# Patient Record
Sex: MALE | Race: WHITE | NOT HISPANIC OR LATINO | Employment: UNEMPLOYED | URBAN - METROPOLITAN AREA
[De-identification: names, ages, dates, MRNs, and addresses within clinical notes are randomized per-mention and may not be internally consistent; named-entity substitution may affect disease eponyms.]

---

## 2024-02-03 ENCOUNTER — OFFICE VISIT (OUTPATIENT)
Dept: URGENT CARE | Facility: CLINIC | Age: 34
End: 2024-02-03
Payer: COMMERCIAL

## 2024-02-03 VITALS
OXYGEN SATURATION: 96 % | TEMPERATURE: 97 F | DIASTOLIC BLOOD PRESSURE: 76 MMHG | HEART RATE: 84 BPM | HEIGHT: 68 IN | RESPIRATION RATE: 18 BRPM | WEIGHT: 270 LBS | BODY MASS INDEX: 40.92 KG/M2 | SYSTOLIC BLOOD PRESSURE: 144 MMHG

## 2024-02-03 DIAGNOSIS — K11.20 SIALADENITIS: Primary | ICD-10-CM

## 2024-02-03 PROCEDURE — G0382 LEV 3 HOSP TYPE B ED VISIT: HCPCS | Performed by: PHYSICIAN ASSISTANT

## 2024-02-03 RX ORDER — PREDNISONE 20 MG/1
40 TABLET ORAL DAILY
Qty: 10 TABLET | Refills: 0 | Status: SHIPPED | OUTPATIENT
Start: 2024-02-03 | End: 2024-02-08

## 2024-02-03 NOTE — PROGRESS NOTES
[facility]        NAME: Justo Colin is a 33 y.o. male  : 1990    MRN: [id number]  DATE: February 3, 2024  TIME: 3:01 PM    Assessment and Plan   Sialadenitis [K11.20]  1. Sialadenitis  predniSONE 20 mg tablet    Ambulatory referral to Family Practice            Patient Instructions   Prednisone.  Fluids.  Lollipops. Chew gum.    Follow up with PCP in 3-5 days.  Proceed to  ER if symptoms worsen.    Chief Complaint     Chief Complaint   Patient presents with    Neck Pain     Jaw pain into right side of neck that worsened since yesterday          History of Present Illness       Patient a 33-year-old male with no significant past medical history presents the office complaining of right-sided neck pain.  Pain is rated 6 out of 10 which is worse with swallowing, cough, sneeze, and neck movements.  States he has 2 broken teeth in the same area so he thought that is what was causing his pain but denies dental pain or pain with chewing.  Orts he also is congestion which is chronic in nature.        Review of Systems   Review of Systems   Constitutional:  Negative for chills and fever.   HENT:  Positive for congestion and sore throat. Negative for dental problem, ear discharge, ear pain and facial swelling.    Respiratory:  Negative for cough.    Cardiovascular:  Negative for chest pain.   Gastrointestinal:  Negative for abdominal pain, diarrhea, nausea and vomiting.   Skin:  Negative for rash.   Neurological:  Negative for dizziness, light-headedness and headaches.         Current Medications       Current Outpatient Medications:     predniSONE 20 mg tablet, Take 2 tablets (40 mg total) by mouth daily for 5 days, Disp: 10 tablet, Rfl: 0    Current Allergies     Allergies as of 2024 - Reviewed 2024   Allergen Reaction Noted    Penicillins Diarrhea and Rash 2006            The following portions of the patient's history were reviewed and updated as appropriate: allergies, current  "medications, past family history, past medical history, past social history, past surgical history and problem list.     History reviewed. No pertinent past medical history.    Past Surgical History:   Procedure Laterality Date    TOE SURGERY         History reviewed. No pertinent family history.      Medications have been verified.        Objective   /76   Pulse 84   Temp (!) 97 °F (36.1 °C) (Tympanic)   Resp 18   Ht 5' 8\" (1.727 m)   Wt 122 kg (270 lb)   SpO2 96%   BMI 41.05 kg/m²   No LMP for male patient.       Physical Exam     Physical Exam  Vitals and nursing note reviewed.   Constitutional:       Appearance: Normal appearance. He is well-developed.   HENT:      Head: Normocephalic and atraumatic.      Right Ear: Tympanic membrane, ear canal and external ear normal.      Left Ear: Tympanic membrane, ear canal and external ear normal.      Nose: Nose normal.      Mouth/Throat:      Dentition: Abnormal dentition. Dental caries present. No dental tenderness, gingival swelling or dental abscesses.      Pharynx: Uvula midline.   Eyes:      General: Lids are normal.      Conjunctiva/sclera: Conjunctivae normal.      Pupils: Pupils are equal, round, and reactive to light.   Neck:     Cardiovascular:      Rate and Rhythm: Normal rate and regular rhythm.      Heart sounds: Normal heart sounds. No murmur heard.     No friction rub. No gallop.   Pulmonary:      Effort: Pulmonary effort is normal.      Breath sounds: Normal breath sounds. No stridor. No wheezing or rales.   Musculoskeletal:         General: Normal range of motion.      Cervical back: Neck supple.   Skin:     General: Skin is warm and dry.      Capillary Refill: Capillary refill takes less than 2 seconds.   Neurological:      Mental Status: He is alert.                   "

## 2024-04-29 ENCOUNTER — OFFICE VISIT (OUTPATIENT)
Dept: URGENT CARE | Facility: CLINIC | Age: 34
End: 2024-04-29
Payer: COMMERCIAL

## 2024-04-29 DIAGNOSIS — K08.89 PAIN, DENTAL: Primary | ICD-10-CM

## 2024-04-29 PROCEDURE — 99213 OFFICE O/P EST LOW 20 MIN: CPT | Performed by: PHYSICIAN ASSISTANT

## 2024-04-30 VITALS
WEIGHT: 274 LBS | SYSTOLIC BLOOD PRESSURE: 150 MMHG | OXYGEN SATURATION: 96 % | BODY MASS INDEX: 41.52 KG/M2 | TEMPERATURE: 97.4 F | DIASTOLIC BLOOD PRESSURE: 86 MMHG | RESPIRATION RATE: 16 BRPM | HEART RATE: 94 BPM | HEIGHT: 68 IN

## 2024-04-30 RX ORDER — IBUPROFEN 800 MG/1
TABLET ORAL EVERY 6 HOURS PRN
COMMUNITY

## 2024-04-30 NOTE — PROGRESS NOTES
[facility]        NAME: Justo Colin is a 33 y.o. male  : 1990    MRN: [id number]  DATE: 2024  TIME: 3:47 PM    Assessment and Plan   Pain, dental [K08.89]  1. Pain, dental          This encounter was completed during downtime. Notes were transcribed.    Clindamycin 300mg prescribed. Take 1 pill TID x 7d. Take 2 pills for loading dosage.(paper script)    Patient Instructions     Medication as prescribed  Follow up with dentist  Salt water gargles  Ice over area  Ibuprofen 600-800mg + Tylenol 1000mg every 6 hours. Do not exceed this amount.  Follow up with PCP in 3-5 days.  Proceed to  ER if symptoms worsen.    If tests have been performed at [facility], our office will contact you with results if changes need to be made to the care plan discussed with you at the visit.  You can review your full results on [facility] MyChart.    Eat yogurt with live and active cultures and/or take a probiotic at least 3 hours before or after antibiotic dose. Monitor stool for diarrhea and/or blood. If this occurs, contact primary care doctor ASAP.       Chief Complaint     Chief Complaint   Patient presents with    Dental Pain     Dentist apt 5.1.24  Right lower dental pain radiating to r side of face for 1-2 days; broken tooth 1 year         History of Present Illness       States he has had a broken tooth x 1 year without pain. Reports new onset of R lower dental pain with R sided facial pain. Patient has a dental appointment on 2024.       Dental Pain   This is a new problem. Episode onset: 1-2 days. The pain is severe. Associated symptoms include facial pain and thermal sensitivity. Pertinent negatives include no difficulty swallowing, fever or sinus pressure. He has tried NSAIDs for the symptoms.       Review of Systems   Review of Systems   Constitutional:  Negative for chills and fever.   HENT:  Positive for dental problem. Negative for rhinorrhea, sinus pressure and sinus pain.   "  Neurological:  Negative for headaches.         Current Medications       Current Outpatient Medications:     ibuprofen (MOTRIN) 800 mg tablet, Take by mouth every 6 (six) hours as needed for mild pain, Disp: , Rfl:     Current Allergies     Allergies as of 04/29/2024 - Reviewed 02/03/2024   Allergen Reaction Noted    Penicillins Diarrhea and Rash 07/23/2006            The following portions of the patient's history were reviewed and updated as appropriate: allergies, current medications, past family history, past medical history, past social history, past surgical history and problem list.     History reviewed. No pertinent past medical history.    Past Surgical History:   Procedure Laterality Date    TOE SURGERY         History reviewed. No pertinent family history.      Medications have been verified.        Objective   /86   Pulse 94   Temp (!) 97.4 °F (36.3 °C)   Resp 16   Ht 5' 8\" (1.727 m)   Wt 124 kg (274 lb)   SpO2 96%   BMI 41.66 kg/m²   No LMP for male patient.       Physical Exam     Physical Exam  Vitals reviewed.   Constitutional:       General: He is not in acute distress.     Appearance: He is well-developed.   HENT:      Head: Normocephalic and atraumatic.      Right Ear: Tympanic membrane, ear canal and external ear normal.      Left Ear: Tympanic membrane, ear canal and external ear normal.      Mouth/Throat:     Cardiovascular:      Rate and Rhythm: Normal rate and regular rhythm.      Heart sounds: Normal heart sounds. No murmur heard.     No friction rub. No gallop.   Pulmonary:      Effort: Pulmonary effort is normal. No respiratory distress.      Breath sounds: Normal breath sounds. No wheezing, rhonchi or rales.   Lymphadenopathy:      Cervical: No cervical adenopathy.   Skin:     General: Skin is warm.   Neurological:      Mental Status: He is alert and oriented to person, place, and time.   Psychiatric:         Behavior: Behavior normal.         Thought Content: Thought " content normal.         Judgment: Judgment normal.

## 2025-02-05 ENCOUNTER — OFFICE VISIT (OUTPATIENT)
Dept: URGENT CARE | Facility: CLINIC | Age: 35
End: 2025-02-05
Payer: COMMERCIAL

## 2025-02-05 VITALS
RESPIRATION RATE: 18 BRPM | BODY MASS INDEX: 43.47 KG/M2 | SYSTOLIC BLOOD PRESSURE: 130 MMHG | HEIGHT: 68 IN | OXYGEN SATURATION: 96 % | HEART RATE: 111 BPM | WEIGHT: 286.8 LBS | DIASTOLIC BLOOD PRESSURE: 88 MMHG | TEMPERATURE: 97.4 F

## 2025-02-05 DIAGNOSIS — H11.421 CHEMOSIS OF RIGHT CONJUNCTIVA: Primary | ICD-10-CM

## 2025-02-05 DIAGNOSIS — S05.01XA ABRASION OF RIGHT CORNEA, INITIAL ENCOUNTER: ICD-10-CM

## 2025-02-05 PROCEDURE — 99213 OFFICE O/P EST LOW 20 MIN: CPT | Performed by: PHYSICIAN ASSISTANT

## 2025-02-05 RX ORDER — TOBRAMYCIN 3 MG/ML
1 SOLUTION/ DROPS OPHTHALMIC 4 TIMES DAILY
Qty: 6 ML | Refills: 0 | Status: SHIPPED | OUTPATIENT
Start: 2025-02-05 | End: 2025-02-05 | Stop reason: CLARIF

## 2025-02-05 RX ORDER — KETOTIFEN FUMARATE 0.35 MG/ML
1 SOLUTION/ DROPS OPHTHALMIC 2 TIMES DAILY
Qty: 5 ML | Refills: 0 | Status: SHIPPED | OUTPATIENT
Start: 2025-02-05

## 2025-02-05 RX ORDER — TOBRAMYCIN 3 MG/ML
2 SOLUTION/ DROPS OPHTHALMIC 4 TIMES DAILY
Qty: 5 ML | Refills: 0 | Status: SHIPPED | OUTPATIENT
Start: 2025-02-05

## 2025-02-05 NOTE — PROGRESS NOTES
"[facility]        NAME: Justo Colin is a 34 y.o. 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 Chemosis of right conjunctiva  Ketotifen Fumarate (ZADITOR) 0.035 % ophthalmic solution    tobramycin (TOBREX) 0.3 % SOLN    DISCONTINUED: tobramycin (TOBREX) 0.3 % SOLN      2. Abrasion of right cornea, initial encounter  Ketotifen Fumarate (ZADITOR) 0.035 % ophthalmic solution    tobramycin (TOBREX) 0.3 % SOLN    DISCONTINUED: tobramycin (TOBREX) 0.3 % SOLN            Patient Instructions       Follow up with PCP in 3-5 days.  Proceed to  ER if symptoms worsen.    Chief Complaint     Chief Complaint   Patient presents with    Eye Pain     Patient states he feels as though something is in his eye but he has flushed it multiple times. Patient has profuse watering, redness, and discharge. Patient denies crusting this morning         History of Present Illness       Pt with right eye irriation and redness for several days,  eyelid stuck together this mornng    Eye Pain   Associated symptoms include an eye discharge and eye redness.       Review of Systems   Review of Systems   Constitutional: Negative.    HENT: Negative.     Eyes:  Positive for discharge and redness.   Respiratory: Negative.     Cardiovascular: Negative.    Gastrointestinal: Negative.    Endocrine: Negative.    Genitourinary: Negative.    Musculoskeletal: Negative.    Skin: Negative.    Allergic/Immunologic: Negative.    Neurological: Negative.    Hematological: Negative.    Psychiatric/Behavioral: Negative.     All other systems reviewed and are negative.        Current Medications       Current Outpatient Medications:     Ketotifen Fumarate (ZADITOR) 0.035 % ophthalmic solution, " "Administer 1 drop to the right eye 2 (two) times a day, Disp: 5 mL, Rfl: 0    tobramycin (TOBREX) 0.3 % SOLN, Administer 2 drops to the right eye 4 (four) times a day, Disp: 5 mL, Rfl: 0    ibuprofen (MOTRIN) 800 mg tablet, Take by mouth every 6 (six) hours as needed for mild pain (Patient not taking: Reported on 2/5/2025), Disp: , Rfl:     Current Allergies     Allergies as of 02/05/2025 - Reviewed 02/05/2025   Allergen Reaction Noted    Penicillins Diarrhea and Rash 07/23/2006            The following portions of the patient's history were reviewed and updated as appropriate: allergies, current medications, past family history, past medical history, past social history, past surgical history and problem list.     History reviewed. No pertinent past medical history.    Past Surgical History:   Procedure Laterality Date    TOE SURGERY         History reviewed. No pertinent family history.      Medications have been verified.        Objective   /88   Pulse (!) 111   Temp (!) 97.4 °F (36.3 °C) (Tympanic)   Resp 18   Ht 5' 8\" (1.727 m)   Wt 130 kg (286 lb 12.8 oz)   SpO2 96%   BMI 43.61 kg/m²        Physical Exam     Physical Exam  Vitals and nursing note reviewed.   Constitutional:       Appearance: Normal appearance. He is normal weight.   HENT:      Head: Normocephalic and atraumatic.      Right Ear: Tympanic membrane, ear canal and external ear normal.      Left Ear: Tympanic membrane, ear canal and external ear normal.      Nose: Nose normal.      Mouth/Throat:      Mouth: Mucous membranes are moist.      Pharynx: Oropharynx is clear.   Eyes:      Extraocular Movements: Extraocular movements intact.      Conjunctiva/sclera: Conjunctivae normal.      Pupils: Pupils are equal, round, and reactive to light.      Comments: Right lateral eye chemmosis with conj injected and yellow d/c  + fluorosceine uptake superficial lateral 3mmx 4mm     lids wnl no f/o in cornea or under eyelids  lids wnl   Right " 20/40  Left 20/30   Cardiovascular:      Rate and Rhythm: Normal rate and regular rhythm.      Pulses: Normal pulses.      Heart sounds: Normal heart sounds.   Pulmonary:      Effort: Pulmonary effort is normal.      Breath sounds: Normal breath sounds.   Abdominal:      General: Bowel sounds are normal.      Palpations: Abdomen is soft.   Musculoskeletal:         General: Normal range of motion.      Cervical back: Normal range of motion and neck supple.   Skin:     General: Skin is warm.      Capillary Refill: Capillary refill takes less than 2 seconds.   Neurological:      Mental Status: He is alert and oriented to person, place, and time.   Psychiatric:         Behavior: Behavior normal.

## 2025-06-14 ENCOUNTER — OFFICE VISIT (OUTPATIENT)
Dept: URGENT CARE | Facility: CLINIC | Age: 35
End: 2025-06-14
Payer: COMMERCIAL

## 2025-06-14 VITALS
TEMPERATURE: 97.7 F | RESPIRATION RATE: 18 BRPM | SYSTOLIC BLOOD PRESSURE: 124 MMHG | HEART RATE: 100 BPM | HEIGHT: 68 IN | OXYGEN SATURATION: 97 % | WEIGHT: 287 LBS | DIASTOLIC BLOOD PRESSURE: 74 MMHG | BODY MASS INDEX: 43.5 KG/M2

## 2025-06-14 DIAGNOSIS — J40 BRONCHITIS: Primary | ICD-10-CM

## 2025-06-14 PROCEDURE — 99213 OFFICE O/P EST LOW 20 MIN: CPT | Performed by: PHYSICIAN ASSISTANT

## 2025-06-14 RX ORDER — PREDNISONE 10 MG/1
10 TABLET ORAL DAILY
Qty: 21 TABLET | Refills: 0 | Status: SHIPPED | OUTPATIENT
Start: 2025-06-14

## 2025-06-14 RX ORDER — DEXTROMETHORPHAN HYDROBROMIDE AND PROMETHAZINE HYDROCHLORIDE 15; 6.25 MG/5ML; MG/5ML
5 SYRUP ORAL 4 TIMES DAILY PRN
Qty: 180 ML | Refills: 0 | Status: SHIPPED | OUTPATIENT
Start: 2025-06-14

## 2025-06-14 NOTE — PROGRESS NOTES
[facility]        NAME: Justo Colin is a 34 y.o. male  : 1990    MRN: [id number]  DATE: 2025  TIME: 8:52 AM    Assessment and Plan   Bronchitis [J40]  1. Bronchitis  predniSONE 10 mg tablet    promethazine-dextromethorphan (PHENERGAN-DM) 6.25-15 mg/5 mL oral syrup            Patient Instructions   Over-the-counter cold and flu medications as needed for symptoms.  Drink plenty of fluids.  Follow-up with the PCP in 3 to 5 days if her symptoms do not improve.  Go to ER if symptoms become severe.    Over-the-counter cold medication 101:  - Guaifenesin (ex. Mucinex) is a mucus thinner.  Good for sinus or chest congestion.  Works best if you drink plenty of fluids.  -Dextromethorphan (ex. Delsym, Mucinex DM) is a cough suppressant  -Pseudoephedrine (ex. Sudaphed) is a decongestion and should not be used by those with high blood pressure or heart problems.  -Flonase is a intranasal steroid.  Good for sinus congestion and postnasal drip.  -Antihistamines (Claritin, Zyrtec, Allegra Benadryl) are used for allergies as well as colds for treatment of congestion and ear fullness.      Follow up with PCP in 3-5 days.  Proceed to  ER if symptoms worsen.    If tests have been performed at [facility], our office will contact you with results if changes need to be made to the care plan discussed with you at the visit.  [facility]    Chief Complaint     Chief Complaint   Patient presents with    Cold Like Symptoms     Cough started about 2 days ago.          History of Present Illness       Patient is a 34-year-old male with no significant past medical history presents the office complaining of cough for 2 days.  States is productive of clear phlegm.  That he was having some cough and chest congestion after he was cleaning out his willian unfinished basement.    URI   This is a new problem. The current episode started in the past 7 days. There has been no fever.  "Associated symptoms include coughing. Pertinent negatives include no abdominal pain, chest pain, congestion, diarrhea, headaches, nausea, sore throat or vomiting. Treatments tried: Try Mucinex, ibuprofen. The treatment provided mild relief.       Review of Systems   Review of Systems   Constitutional:  Negative for chills and fever.   HENT:  Negative for congestion and sore throat.    Respiratory:  Positive for cough. Negative for shortness of breath.    Cardiovascular:  Negative for chest pain and palpitations.   Gastrointestinal:  Negative for abdominal pain, diarrhea, nausea and vomiting.   Musculoskeletal:  Negative for myalgias.   Neurological:  Negative for dizziness, light-headedness and headaches.         Current Medications     Current Medications[1]    Current Allergies     Allergies as of 06/14/2025 - Reviewed 06/14/2025   Allergen Reaction Noted    Penicillins Diarrhea and Rash 07/23/2006            The following portions of the patient's history were reviewed and updated as appropriate: allergies, current medications, past family history, past medical history, past social history, past surgical history and problem list.     Past Medical History[2]    Past Surgical History[3]    Family History[4]      Medications have been verified.        Objective   /74   Pulse 100   Temp 97.7 °F (36.5 °C)   Resp 18   Ht 5' 8\" (1.727 m)   Wt 130 kg (287 lb)   SpO2 97%   BMI 43.64 kg/m²   No LMP for male patient.       Physical Exam     Physical Exam  Vitals and nursing note reviewed.   Constitutional:       Appearance: Normal appearance. He is well-developed.   HENT:      Head: Normocephalic and atraumatic.      Right Ear: Tympanic membrane, ear canal and external ear normal.      Left Ear: Tympanic membrane, ear canal and external ear normal.      Nose: Nose normal.      Mouth/Throat:      Pharynx: Uvula midline.     Eyes:      General: Lids are normal.      Conjunctiva/sclera: Conjunctivae normal.      " Pupils: Pupils are equal, round, and reactive to light.       Cardiovascular:      Rate and Rhythm: Normal rate and regular rhythm.      Heart sounds: Normal heart sounds. No murmur heard.     No friction rub. No gallop.   Pulmonary:      Effort: Pulmonary effort is normal.      Breath sounds: Normal breath sounds. No stridor. No wheezing or rales.     Musculoskeletal:         General: Normal range of motion.      Cervical back: Neck supple.     Skin:     General: Skin is warm and dry.      Capillary Refill: Capillary refill takes less than 2 seconds.     Neurological:      Mental Status: He is alert.                        [1]   Current Outpatient Medications:     predniSONE 10 mg tablet, Take 1 tablet (10 mg total) by mouth daily Take 6 on day 1, take 5 on day 2, take 4 on day 3, take 3 on day 4, take 2 on day 5, take 1 on day 6., Disp: 21 tablet, Rfl: 0    promethazine-dextromethorphan (PHENERGAN-DM) 6.25-15 mg/5 mL oral syrup, Take 5 mL by mouth 4 (four) times a day as needed for cough, Disp: 180 mL, Rfl: 0    ibuprofen (MOTRIN) 800 mg tablet, Take by mouth every 6 (six) hours as needed for mild pain (Patient not taking: Reported on 2/5/2025), Disp: , Rfl:     Ketotifen Fumarate (ZADITOR) 0.035 % ophthalmic solution, Administer 1 drop to the right eye 2 (two) times a day (Patient not taking: Reported on 6/14/2025), Disp: 5 mL, Rfl: 0    tobramycin (TOBREX) 0.3 % SOLN, Administer 2 drops to the right eye 4 (four) times a day (Patient not taking: Reported on 6/14/2025), Disp: 5 mL, Rfl: 0  [2] No past medical history on file.  [3]   Past Surgical History:  Procedure Laterality Date    TOE SURGERY     [4] No family history on file.

## 2025-08-07 ENCOUNTER — OFFICE VISIT (OUTPATIENT)
Dept: URGENT CARE | Facility: CLINIC | Age: 35
End: 2025-08-07
Payer: COMMERCIAL

## 2025-08-07 VITALS
TEMPERATURE: 97.6 F | SYSTOLIC BLOOD PRESSURE: 126 MMHG | DIASTOLIC BLOOD PRESSURE: 84 MMHG | HEIGHT: 68 IN | WEIGHT: 290 LBS | BODY MASS INDEX: 43.95 KG/M2 | HEART RATE: 110 BPM | RESPIRATION RATE: 18 BRPM | OXYGEN SATURATION: 97 %

## 2025-08-07 DIAGNOSIS — L08.9 LOCAL INFECTION OF THE SKIN AND SUBCUTANEOUS TISSUE, UNSPECIFIED: Primary | ICD-10-CM

## 2025-08-07 PROCEDURE — 99213 OFFICE O/P EST LOW 20 MIN: CPT

## 2025-08-07 RX ORDER — PREDNISONE 10 MG/1
TABLET ORAL
Qty: 21 TABLET | Refills: 0 | Status: SHIPPED | OUTPATIENT
Start: 2025-08-07 | End: 2025-08-13

## 2025-08-07 RX ORDER — SULFAMETHOXAZOLE AND TRIMETHOPRIM 800; 160 MG/1; MG/1
1 TABLET ORAL EVERY 12 HOURS SCHEDULED
Qty: 14 TABLET | Refills: 0 | Status: SHIPPED | OUTPATIENT
Start: 2025-08-07 | End: 2025-08-14

## 2025-08-07 RX ORDER — CALCIUM CARBONATE 500 MG/1
500 TABLET, CHEWABLE ORAL DAILY
COMMUNITY